# Patient Record
Sex: FEMALE | Race: WHITE | URBAN - METROPOLITAN AREA
[De-identification: names, ages, dates, MRNs, and addresses within clinical notes are randomized per-mention and may not be internally consistent; named-entity substitution may affect disease eponyms.]

---

## 2023-05-24 ENCOUNTER — OFFICE VISIT (OUTPATIENT)
Dept: URBAN - METROPOLITAN AREA CLINIC 23 | Facility: CLINIC | Age: 48
End: 2023-05-24
Payer: COMMERCIAL

## 2023-05-24 DIAGNOSIS — E11.9 TYPE 2 DIABETES MELLITUS W/O COMPLICATION: Primary | ICD-10-CM

## 2023-05-24 PROCEDURE — 99203 OFFICE O/P NEW LOW 30 MIN: CPT | Performed by: OPTOMETRIST

## 2023-05-24 ASSESSMENT — KERATOMETRY
OS: 45.88
OD: 45.75

## 2023-05-24 ASSESSMENT — INTRAOCULAR PRESSURE
OD: 14
OS: 15

## 2023-05-24 NOTE — IMPRESSION/PLAN
Impression: Type 2 diabetes mellitus w/o complication: L66.4. Bilateral. Condition: will continue to monitor. Plan: Discussed findings. OPTOS photos ordered and reviewed with patient today. No signs of retinopathy or neovascularization noted today. Continue working on blood sugar control and f/u with PCP, monitor yearly. Recommended patient seek consult with diabetic nutritionist. Discussed vision changes associated with fluctuating sugars and presbyopia.